# Patient Record
Sex: MALE | Race: BLACK OR AFRICAN AMERICAN | URBAN - METROPOLITAN AREA
[De-identification: names, ages, dates, MRNs, and addresses within clinical notes are randomized per-mention and may not be internally consistent; named-entity substitution may affect disease eponyms.]

---

## 2017-01-13 ENCOUNTER — OFFICE VISIT (OUTPATIENT)
Dept: FAMILY MEDICINE | Facility: CLINIC | Age: 47
End: 2017-01-13

## 2017-01-13 VITALS
WEIGHT: 214 LBS | HEIGHT: 64 IN | DIASTOLIC BLOOD PRESSURE: 100 MMHG | BODY MASS INDEX: 36.54 KG/M2 | OXYGEN SATURATION: 97 % | SYSTOLIC BLOOD PRESSURE: 160 MMHG | TEMPERATURE: 98.9 F | HEART RATE: 79 BPM

## 2017-01-13 DIAGNOSIS — N23 RENAL COLIC ON RIGHT SIDE: ICD-10-CM

## 2017-01-13 DIAGNOSIS — I10 HYPERTENSION GOAL BP (BLOOD PRESSURE) < 140/90: ICD-10-CM

## 2017-01-13 DIAGNOSIS — E83.59 CALCIUM OXALATE CALCULUS: Primary | ICD-10-CM

## 2017-01-13 DIAGNOSIS — N10 ACUTE PYELONEPHRITIS: ICD-10-CM

## 2017-01-13 LAB
ALBUMIN UR-MCNC: NEGATIVE MG/DL
APPEARANCE UR: CLEAR
BILIRUB UR QL STRIP: NEGATIVE
COLOR UR AUTO: YELLOW
GLUCOSE UR STRIP-MCNC: NEGATIVE MG/DL
HGB UR QL STRIP: ABNORMAL
KETONES UR STRIP-MCNC: NEGATIVE MG/DL
LEUKOCYTE ESTERASE UR QL STRIP: NEGATIVE
NITRATE UR QL: NEGATIVE
PH UR STRIP: 6.5 PH (ref 5–7)
RBC #/AREA URNS AUTO: ABNORMAL /HPF (ref 0–2)
SP GR UR STRIP: 1.02 (ref 1–1.03)
URN SPEC COLLECT METH UR: ABNORMAL
UROBILINOGEN UR STRIP-ACNC: 0.2 EU/DL (ref 0.2–1)
WBC #/AREA URNS AUTO: ABNORMAL /HPF (ref 0–2)

## 2017-01-13 PROCEDURE — 99214 OFFICE O/P EST MOD 30 MIN: CPT | Performed by: INTERNAL MEDICINE

## 2017-01-13 PROCEDURE — 81001 URINALYSIS AUTO W/SCOPE: CPT | Performed by: INTERNAL MEDICINE

## 2017-01-13 RX ORDER — AMLODIPINE AND BENAZEPRIL HYDROCHLORIDE 5; 10 MG/1; MG/1
1 CAPSULE ORAL DAILY
COMMUNITY
End: 2017-01-13 | Stop reason: DRUGHIGH

## 2017-01-13 RX ORDER — AMLODIPINE AND BENAZEPRIL HYDROCHLORIDE 10; 20 MG/1; MG/1
1 CAPSULE ORAL AT BEDTIME
Qty: 30 CAPSULE | Refills: 11 | Status: SHIPPED | OUTPATIENT
Start: 2017-01-13 | End: 2018-02-21

## 2017-01-13 RX ORDER — CIPROFLOXACIN 500 MG/1
500 TABLET, FILM COATED ORAL 2 TIMES DAILY
Qty: 14 TABLET | Refills: 1 | Status: SHIPPED | OUTPATIENT
Start: 2017-01-13 | End: 2017-01-20

## 2017-01-13 RX ORDER — CIPROFLOXACIN 500 MG/1
TABLET, FILM COATED ORAL
Refills: 0 | COMMUNITY
Start: 2017-01-09 | End: 2017-01-13

## 2017-01-13 RX ORDER — TAMSULOSIN HYDROCHLORIDE 0.4 MG/1
0.4 CAPSULE ORAL AT BEDTIME
Qty: 30 CAPSULE | Refills: 0 | Status: SHIPPED | OUTPATIENT
Start: 2017-01-13

## 2017-01-13 NOTE — Clinical Note
"  January 13, 2017      Chao Palomares  [address] [place] [address]        Dear Mr. Palomares,                            Due to your persistently high blood pressure, I recommend a RN (nurse) visit within one month to repeat your blood pressure. This is important to determine if your adjusted blood pressure medication is working. Since you are considered to have higher risk of heart disease due to your hypertension, it is also necessary to repeat  LDLtest ( low density lipoprotein or also known as \"bad cholesterol\").  You may come in anytime this test (not fasting). For any questions, you may call my office at [phone].      Sincerely,      Javon Lockhart MD  Internal Medicine   [facility] Care Team  "

## 2017-01-13 NOTE — PROGRESS NOTES
SUBJECTIVE:                                                    Chao Palomares is a 46 year old male who presents to clinic today for the following health issues:    Genitourinary - Male     Onset: 1 month     Description:   Dysuria (painful urination): YES  Hematuria (blood in urine): no   Frequency: YES  Are you urinating at night : YES  Hesitancy (delay in urine): no   Retention (unable to empty): no   Decrease in urinary flow: no   Incontinence: no     Progression of Symptoms:  improving    Accompanying Signs & Symptoms:  Fever: no   Back/Flank pain: YES- right side  Urethral discharge: no   Testicle lumps/masses/pain: no   Nausea and/or vomiting: no   Abdominal pain: no    History:   History of frequent UTI's: no   History of kidney stones: YES  History of hernias: no   Personal or Family history of Prostate problems: no  Sexually active: YES    Precipitating factors:       Alleviating factors:           Therapies Tried and outcome: cipro; Outcome - little relief       Hypertension Follow-up      Outpatient blood pressures monitoring: no    Low Salt Diet: no    Adverse effects: none    Compliance: good    Secondary causes: no    Chronic kidney disease: no    Hyperthyroidism: no    Anxiety: no    Decongestants: no    Substance abuse: no    Diabetes: no    Ischemic heart disease: no    Stroke: no    Hyperlipidemia: no    Associated symptoms: resolution of dizziness and diaphoresis since taking Amlodipine-Benazepril.       Problem list and histories reviewed & adjusted, as indicated.  Additional history: as documented    Patient Active Problem List   Diagnosis     CARDIOVASCULAR SCREENING; LDL GOAL LESS THAN 130     Elevated prostate specific antigen (PSA)     Hypertension goal BP (blood pressure) < 140/90     Calcium oxalate calculus     Gross hematuria     Hydronephrosis of right kidney     History reviewed. No pertinent past surgical history.    Social History   Substance Use Topics     Smoking status: Never  "Smoker      Smokeless tobacco: Never Used     Alcohol Use: Yes     History reviewed. No pertinent family history.      No Known Allergies  Recent Labs   Lab Test  11/16/16   1235  09/04/15   0812   LDL   --   119   HDL   --   43   TRIG   --   95   ALT  25   --    CR  1.05  1.12   GFRESTIMATED  75  70   GFRESTBLACK  >90   GFR Calc    85   POTASSIUM  4.1  4.3      BP Readings from Last 3 Encounters:   01/13/17 160/100   11/16/16 198/99   09/04/15 181/116    Wt Readings from Last 3 Encounters:   01/13/17 214 lb (97.07 kg)   11/16/16 217 lb (98.431 kg)   09/04/15 214 lb (97.07 kg)                  Labs reviewed in EPIC  Problem list, Medication list, Allergies, and Medical/Social/Surgical histories reviewed in [facility] and updated as appropriate.    ROS:  C: NEGATIVE for fever, chills, change in weight  I: NEGATIVE for worrisome rashes, moles or lesions  E: NEGATIVE for vision changes or irritation  E/M: NEGATIVE for ear, mouth and throat problems  R: NEGATIVE for significant cough or SOB  CV: NEGATIVE for chest pain, palpitations or peripheral edema  GI: NEGATIVE for nausea, abdominal pain, heartburn, or change in bowel habits  : NEGATIVE for frequency, dysuria, or hematuria  M: NEGATIVE for significant arthralgias or myalgia  N: NEGATIVE for weakness, dizziness or paresthesias  E: NEGATIVE for temperature intolerance, skin/hair changes  H: NEGATIVE for bleeding problems  P: NEGATIVE for changes in mood or affect    OBJECTIVE:                                                    /100 mmHg  Pulse 79  Temp(Src) 98.9  F (37.2  C) (Oral)  Ht 5' 4\" (1.626 m)  Wt 214 lb (97.07 kg)  BMI 36.72 kg/m2  SpO2 97%  Body mass index is 36.72 kg/(m^2).  GENERAL: healthy, alert and no distress  EYES: Eyes grossly normal to inspection, PERRL and conjunctivae and sclerae normal  HENT: ear canals and TM's normal, nose and mouth without ulcers or lesions  NECK: no adenopathy, no asymmetry, masses, or scars and " thyroid normal to palpation  RESP: lungs clear to auscultation - no rales, rhonchi or wheezes  CV: regular rate and rhythm, normal S1 S2, no S3 or S4, no murmur, click or rub, no peripheral edema and peripheral pulses strong  ABDOMEN: soft, nontender, no hepatosplenomegaly, no masses and bowel sounds normal. Right flank tenderness noted.  : Deferred.  MS: no gross musculoskeletal defects noted, no edema  SKIN: no suspicious lesions or rashes  NEURO: Normal strength and tone, mentation intact and speech normal  PSYCH: mentation appears normal, affect normal/bright    Diagnostic Test Results:  Results for orders placed or performed in visit on 01/13/17 (from the past 24 hour(s))   UA reflex to Microscopic and Culture   Result Value Ref Range    Color Urine Yellow     Appearance Urine Clear     Glucose Urine Negative NEG mg/dL    Bilirubin Urine Negative NEG    Ketones Urine Negative NEG mg/dL    Specific Gravity Urine 1.020 1.003 - 1.035    Blood Urine Large (A) NEG    pH Urine 6.5 5.0 - 7.0 pH    Protein Albumin Urine Negative NEG mg/dL    Urobilinogen Urine 0.2 0.2 - 1.0 EU/dL    Nitrite Urine Negative NEG    Leukocyte Esterase Urine Negative NEG    Source Midstream Urine    Urine Microscopic   Result Value Ref Range    WBC Urine O - 2 0 - 2 /HPF    RBC Urine  (A) 0 - 2 /HPF        ASSESSMENT/PLAN:                                                            (N20.0) Calcium oxalate calculus  (primary encounter diagnosis)  Comment: Large amount of urine RBC noted. If microscopic hematuria persists despite passage of renal stones, then consider workup for glomerulonephritis, though it is unlikely since there no RBC casts in urine. Stone analysis last August 2016 confirms calcium oxalate. Dietary modification is very important.  Plan: UA reflex to Microscopic and Culture, Urine         Microscopic            (N23) Renal colic on right side  Comment: Denies any fever, nausea or vomiting. Requires Tamsulosin to  facilitate stone passage. No reason to use opiates for pain control.  Plan: tamsulosin (FLOMAX) 0.4 MG capsule            (N10) Acute pyelonephritis  Comment: Suspected despite no bacteriuria from urinalysis. Due to persistent right flank pain, there is high probability of pyelonephritis. Treat empirically with quinolones.  Plan: ciprofloxacin (CIPRO) 500 MG tablet            (I10) Hypertension goal BP (blood pressure) < 140/90  Comment: Blood pressure not within goal range. Increase Lotrel from 5-10 mg once daily to 10-20 mg once daily. Repeat blood pressure within 1 - 2 months via RN visit. Monitor for renal insufficiency. Repeat urine microalbumin in 10 months.  Plan: amLODIPine-benazepril (LOTREL) 10-20 MG per         capsule              FUTURE APPOINTMENTS:       - Follow-up visit in 1 -2 months for BP check. Obtain non-fasting LDL.    Javon Lockhart MD  [facility]

## 2017-01-13 NOTE — PATIENT INSTRUCTIONS
This summary includes the important diagnoses, test, medications and other important parts of your medical history.  Below are a few good we sites you can use to learn more about these.     Www.IntelligentM.org : Up to date and easily searchable information on multiple topics.  Www.[email or web address].[email or web address]/c_[email or web address] : [facility] $4.99 medications  Www.medlineplus.gov : medication info, interactive tutorials, watch real surgeries online  Www.familydoctor.org : good info from the Academy of Family Physicians  Www.[facility].Powered : good info from the [facility]  Www.cdc.gov : public health info, travel advisories, epidemics (H1N1)  Www.aap.org : children's health info, normal development, vaccinations  Www.health.[facility].[place].us : [place] dept of heat, public health issues in [place], N1N1    Based on your medical history and these are the current health maintenance or preventive care services that you are due for (some may have been done at this visit:)  Health Maintenance Due   Topic Date Due     TETANUS IMMUNIZATION (SYSTEM ASSIGNED)  02/22/2016     INFLUENZA VACCINE (SYSTEM ASSIGNED)  09/01/2016     =================================================================================  Normal Values   Blood pressure  <140/90 for most adults    <130/80 for some chronic diseases (ask your care team about yours)    BMI (body mass index)  18.5-25 kg/m2 (based on height and weight)     Thank you for visiting [facility]    Normal or non-critical lab and imaging results will be communicated to you by MyChart, letter or phone within 7 days.  If you do not hear from us within 10 days, please call the clinic. If you have a critical or abnormal lab result, we will notify you by phone as soon as possible.     If you have any questions regarding your visit please contact:     Team Comfort:   Clinic Hours Telephone Number   Dr. Bruce Barakat    7am-5pm  Monday - Friday [phone]  [facility]   Pharmacy 8am-8pm Monday-Thursday      8am-6pm Friday  9am-5pm Saturday-Sunday [phone]   Urgent Care 11am-8pm Monday-Friday        9am-5pm Saturday-Sunday [phone]     After hours, weekend or if you need to make an appointment with your primary provider please call [phone].   After Hours nurse advise: call [facility]: [phone]    Medication Refills:  Call your pharmacy and they will forward the refill to us. Please allow 3 business days for your refills to be completed.    Use [employer] (secure email communication and access to your chart) to send your primary care provider a message or make an appointment. Ask someone on your Team how to sign up for [employer]. To log on to [employer] or for more information in [employer] please visit the website at [email or web address].  As of October 8, 2013, all password changes, disabled accounts, or ID changes in ICAgen/MyHealth will be done by our Access Services Department.   If you need help with your account or password, call: [phone]. Clinic staff no longer has the ability to change passwords.

## 2017-01-13 NOTE — NURSING NOTE
"Chief Complaint   Patient presents with     Kidney Problem     UTI       Initial /111 mmHg  Pulse 79  Temp(Src) 98.9  F (37.2  C) (Oral)  Ht 5' 4\" (1.626 m)  Wt 214 lb (97.07 kg)  BMI 36.72 kg/m2  SpO2 97% Estimated body mass index is 36.72 kg/(m^2) as calculated from the following:    Height as of this encounter: 5' 4\" (1.626 m).    Weight as of this encounter: 214 lb (97.07 kg).  BP completed using cuff size: regular    Adilson Ortiz MA      "

## 2017-06-13 ENCOUNTER — TELEPHONE (OUTPATIENT)
Dept: FAMILY MEDICINE | Facility: CLINIC | Age: 47
End: 2017-06-13

## 2017-06-13 NOTE — TELEPHONE ENCOUNTER
Panel Management Review      BP Readings from Last 1 Encounters:   01/13/17 (!) 160/100        Fail List measure: BP      Patient is due/failing the following:   BP CHECK    Action needed:   Patient needs office visit for Blood Pressure check.    Type of outreach:    Sent [employer] message.    Questions for provider review:    None                                                                                                                                    Nisha Hernandez MA

## 2017-06-13 NOTE — LETTER
June 23, 2017          Chao Palomares  [address] [place] [address]            Dear Chao Palomares,      At [facility] we care about your health and are committed to providing quality patient care. Regular appointments are a vital part of the care and management of your health and can help prevent many of the complications that can occur.      It has come to our attention that you are due for Blood Pressure check.  Please call [facility] at [phone] soon to schedule your follow up appointment.    If you have transferred care to another clinic please call to inform us so that we do not continue to send you reminder letters.      Sincerely,      [facility] Care Team/kb

## 2020-02-23 ENCOUNTER — HEALTH MAINTENANCE LETTER (OUTPATIENT)
Age: 50
End: 2020-02-23

## 2020-12-12 ENCOUNTER — HEALTH MAINTENANCE LETTER (OUTPATIENT)
Age: 50
End: 2020-12-12

## 2021-04-11 ENCOUNTER — HEALTH MAINTENANCE LETTER (OUTPATIENT)
Age: 51
End: 2021-04-11

## 2021-09-26 ENCOUNTER — HEALTH MAINTENANCE LETTER (OUTPATIENT)
Age: 51
End: 2021-09-26

## 2022-05-08 ENCOUNTER — HEALTH MAINTENANCE LETTER (OUTPATIENT)
Age: 52
End: 2022-05-08

## 2023-01-08 ENCOUNTER — HEALTH MAINTENANCE LETTER (OUTPATIENT)
Age: 53
End: 2023-01-08

## 2023-06-02 ENCOUNTER — HEALTH MAINTENANCE LETTER (OUTPATIENT)
Age: 53
End: 2023-06-02